# Patient Record
Sex: FEMALE | Race: WHITE | NOT HISPANIC OR LATINO | URBAN - METROPOLITAN AREA
[De-identification: names, ages, dates, MRNs, and addresses within clinical notes are randomized per-mention and may not be internally consistent; named-entity substitution may affect disease eponyms.]

---

## 2017-04-29 ENCOUNTER — INPATIENT (INPATIENT)
Facility: HOSPITAL | Age: 52
LOS: 3 days | Discharge: NURSING, REHAB OR LONG-TERM CARE | DRG: 535 | End: 2017-05-03
Attending: HOSPITALIST | Admitting: HOSPITALIST
Payer: COMMERCIAL

## 2017-04-29 VITALS
HEART RATE: 62 BPM | RESPIRATION RATE: 18 BRPM | TEMPERATURE: 98 F | OXYGEN SATURATION: 100 % | DIASTOLIC BLOOD PRESSURE: 91 MMHG | HEIGHT: 65 IN | SYSTOLIC BLOOD PRESSURE: 145 MMHG | WEIGHT: 115.08 LBS

## 2017-04-29 DIAGNOSIS — Z29.9 ENCOUNTER FOR PROPHYLACTIC MEASURES, UNSPECIFIED: ICD-10-CM

## 2017-04-29 DIAGNOSIS — Z90.49 ACQUIRED ABSENCE OF OTHER SPECIFIED PARTS OF DIGESTIVE TRACT: Chronic | ICD-10-CM

## 2017-04-29 DIAGNOSIS — R74.0 NONSPECIFIC ELEVATION OF LEVELS OF TRANSAMINASE AND LACTIC ACID DEHYDROGENASE [LDH]: ICD-10-CM

## 2017-04-29 DIAGNOSIS — S32.9XXA FRACTURE OF UNSPECIFIED PARTS OF LUMBOSACRAL SPINE AND PELVIS, INITIAL ENCOUNTER FOR CLOSED FRACTURE: ICD-10-CM

## 2017-04-29 DIAGNOSIS — Z90.89 ACQUIRED ABSENCE OF OTHER ORGANS: Chronic | ICD-10-CM

## 2017-04-29 DIAGNOSIS — S70.02XA CONTUSION OF LEFT HIP, INITIAL ENCOUNTER: ICD-10-CM

## 2017-04-29 DIAGNOSIS — R63.8 OTHER SYMPTOMS AND SIGNS CONCERNING FOOD AND FLUID INTAKE: ICD-10-CM
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
## 2017-04-29 DIAGNOSIS — E87.1 HYPO-OSMOLALITY AND HYPONATREMIA: ICD-10-CM

## 2017-04-29 LAB
ALBUMIN SERPL ELPH-MCNC: 3.6 G/DL — SIGNIFICANT CHANGE UP (ref 3.4–5)
ALP SERPL-CCNC: 58 U/L — SIGNIFICANT CHANGE UP (ref 40–120)
ALT FLD-CCNC: 45 U/L — HIGH (ref 12–42)
ANION GAP SERPL CALC-SCNC: 6 MMOL/L — LOW (ref 9–16)
APPEARANCE UR: CLEAR — SIGNIFICANT CHANGE UP
APTT BLD: 28.6 SEC — SIGNIFICANT CHANGE UP (ref 27.5–37.4)
AST SERPL-CCNC: 47 U/L — HIGH (ref 15–37)
BASOPHILS NFR BLD AUTO: 0.3 % — SIGNIFICANT CHANGE UP (ref 0–2)
BILIRUB SERPL-MCNC: 1.2 MG/DL — SIGNIFICANT CHANGE UP (ref 0.2–1.2)
BILIRUB UR-MCNC: NEGATIVE — SIGNIFICANT CHANGE UP
BLD GP AB SCN SERPL QL: NEGATIVE — SIGNIFICANT CHANGE UP
BUN SERPL-MCNC: 16 MG/DL — SIGNIFICANT CHANGE UP (ref 7–23)
CALCIUM SERPL-MCNC: 8.6 MG/DL — SIGNIFICANT CHANGE UP (ref 8.5–10.5)
CHLORIDE SERPL-SCNC: 99 MMOL/L — SIGNIFICANT CHANGE UP (ref 96–108)
CK SERPL-CCNC: 371 U/L — HIGH (ref 26–192)
CO2 SERPL-SCNC: 27 MMOL/L — SIGNIFICANT CHANGE UP (ref 22–31)
COLOR SPEC: YELLOW — SIGNIFICANT CHANGE UP
CREAT SERPL-MCNC: 0.6 MG/DL — SIGNIFICANT CHANGE UP (ref 0.5–1.3)
DIFF PNL FLD: NEGATIVE — SIGNIFICANT CHANGE UP
EOSINOPHIL NFR BLD AUTO: 0.3 % — SIGNIFICANT CHANGE UP (ref 0–6)
GLUCOSE SERPL-MCNC: 91 MG/DL — SIGNIFICANT CHANGE UP (ref 70–99)
GLUCOSE UR QL: NEGATIVE — SIGNIFICANT CHANGE UP
HCT VFR BLD CALC: 34 % — LOW (ref 34.5–45)
HCT VFR BLD CALC: 36.8 % — SIGNIFICANT CHANGE UP (ref 34.5–45)
HGB BLD-MCNC: 11.6 G/DL — SIGNIFICANT CHANGE UP (ref 11.5–15.5)
HGB BLD-MCNC: 12.7 G/DL — SIGNIFICANT CHANGE UP (ref 11.5–15.5)
INR BLD: 0.96 — SIGNIFICANT CHANGE UP (ref 0.88–1.16)
KETONES UR-MCNC: NEGATIVE — SIGNIFICANT CHANGE UP
LEUKOCYTE ESTERASE UR-ACNC: NEGATIVE — SIGNIFICANT CHANGE UP
LYMPHOCYTES # BLD AUTO: 19.8 % — SIGNIFICANT CHANGE UP (ref 13–44)
MCHC RBC-ENTMCNC: 31.1 PG — SIGNIFICANT CHANGE UP (ref 27–34)
MCHC RBC-ENTMCNC: 31.8 PG — SIGNIFICANT CHANGE UP (ref 27–34)
MCHC RBC-ENTMCNC: 34.1 G/DL — SIGNIFICANT CHANGE UP (ref 32–36)
MCHC RBC-ENTMCNC: 34.5 G/DL — SIGNIFICANT CHANGE UP (ref 32–36)
MCV RBC AUTO: 91.2 FL — SIGNIFICANT CHANGE UP (ref 80–100)
MCV RBC AUTO: 92 FL — SIGNIFICANT CHANGE UP (ref 80–100)
MONOCYTES NFR BLD AUTO: 7.2 % — SIGNIFICANT CHANGE UP (ref 2–14)
NEUTROPHILS NFR BLD AUTO: 72.4 % — SIGNIFICANT CHANGE UP (ref 43–77)
NITRITE UR-MCNC: NEGATIVE — SIGNIFICANT CHANGE UP
PH UR: 7 — SIGNIFICANT CHANGE UP (ref 5–8)
PLATELET # BLD AUTO: 170 K/UL — SIGNIFICANT CHANGE UP (ref 150–400)
PLATELET # BLD AUTO: 188 K/UL — SIGNIFICANT CHANGE UP (ref 150–400)
POTASSIUM SERPL-MCNC: 4.8 MMOL/L — SIGNIFICANT CHANGE UP (ref 3.5–5.3)
POTASSIUM SERPL-SCNC: 4.8 MMOL/L — SIGNIFICANT CHANGE UP (ref 3.5–5.3)
PROT SERPL-MCNC: 6.8 G/DL — SIGNIFICANT CHANGE UP (ref 6.4–8.2)
PROT UR-MCNC: NEGATIVE MG/DL — SIGNIFICANT CHANGE UP
PROTHROM AB SERPL-ACNC: 10.7 SEC — SIGNIFICANT CHANGE UP (ref 9.8–12.7)
RBC # BLD: 3.73 M/UL — LOW (ref 3.8–5.2)
RBC # BLD: 4 M/UL — SIGNIFICANT CHANGE UP (ref 3.8–5.2)
RBC # FLD: 13 % — SIGNIFICANT CHANGE UP (ref 10.3–16.9)
RBC # FLD: 13.5 % — SIGNIFICANT CHANGE UP (ref 10.3–16.9)
RH IG SCN BLD-IMP: POSITIVE — SIGNIFICANT CHANGE UP
SODIUM SERPL-SCNC: 132 MMOL/L — LOW (ref 135–145)
SP GR SPEC: <=1.005 — SIGNIFICANT CHANGE UP (ref 1–1.03)
UROBILINOGEN FLD QL: 0.2 E.U./DL — SIGNIFICANT CHANGE UP
WBC # BLD: 5.4 K/UL — SIGNIFICANT CHANGE UP (ref 3.8–10.5)
WBC # BLD: 6.2 K/UL — SIGNIFICANT CHANGE UP (ref 3.8–10.5)
WBC # FLD AUTO: 5.4 K/UL — SIGNIFICANT CHANGE UP (ref 3.8–10.5)
WBC # FLD AUTO: 6.2 K/UL — SIGNIFICANT CHANGE UP (ref 3.8–10.5)

## 2017-04-29 PROCEDURE — 73700 CT LOWER EXTREMITY W/O DYE: CPT | Mod: 26,LT

## 2017-04-29 PROCEDURE — 72192 CT PELVIS W/O DYE: CPT | Mod: 26

## 2017-04-29 PROCEDURE — 73502 X-RAY EXAM HIP UNI 2-3 VIEWS: CPT | Mod: 26,LT

## 2017-04-29 PROCEDURE — 71101 X-RAY EXAM UNILAT RIBS/CHEST: CPT | Mod: 26,LT

## 2017-04-29 PROCEDURE — 99223 1ST HOSP IP/OBS HIGH 75: CPT

## 2017-04-29 PROCEDURE — 72190 X-RAY EXAM OF PELVIS: CPT | Mod: 26

## 2017-04-29 PROCEDURE — 99285 EMERGENCY DEPT VISIT HI MDM: CPT

## 2017-04-29 PROCEDURE — 72110 X-RAY EXAM L-2 SPINE 4/>VWS: CPT | Mod: 26

## 2017-04-29 RX ORDER — SODIUM CHLORIDE 9 MG/ML
1000 INJECTION INTRAMUSCULAR; INTRAVENOUS; SUBCUTANEOUS
Qty: 0 | Refills: 0 | Status: DISCONTINUED | OUTPATIENT
Start: 2017-04-29 | End: 2017-04-29

## 2017-04-29 RX ORDER — HEPARIN SODIUM 5000 [USP'U]/ML
5000 INJECTION INTRAVENOUS; SUBCUTANEOUS EVERY 8 HOURS
Qty: 0 | Refills: 0 | Status: DISCONTINUED | OUTPATIENT
Start: 2017-04-29 | End: 2017-04-29

## 2017-04-29 RX ORDER — ACETAMINOPHEN 500 MG
650 TABLET ORAL EVERY 6 HOURS
Qty: 0 | Refills: 0 | Status: DISCONTINUED | OUTPATIENT
Start: 2017-04-29 | End: 2017-05-03

## 2017-04-29 RX ORDER — IBUPROFEN 200 MG
600 TABLET ORAL EVERY 6 HOURS
Qty: 0 | Refills: 0 | Status: DISCONTINUED | OUTPATIENT
Start: 2017-04-29 | End: 2017-05-03

## 2017-04-29 RX ORDER — SODIUM CHLORIDE 9 MG/ML
1000 INJECTION INTRAMUSCULAR; INTRAVENOUS; SUBCUTANEOUS
Qty: 0 | Refills: 0 | Status: DISCONTINUED | OUTPATIENT
Start: 2017-04-29 | End: 2017-05-03

## 2017-04-29 RX ORDER — TRAMADOL HYDROCHLORIDE 50 MG/1
50 TABLET ORAL EVERY 6 HOURS
Qty: 0 | Refills: 0 | Status: DISCONTINUED | OUTPATIENT
Start: 2017-04-29 | End: 2017-05-03

## 2017-04-29 RX ADMIN — TRAMADOL HYDROCHLORIDE 50 MILLIGRAM(S): 50 TABLET ORAL at 22:51

## 2017-04-29 RX ADMIN — SODIUM CHLORIDE 100 MILLILITER(S): 9 INJECTION INTRAMUSCULAR; INTRAVENOUS; SUBCUTANEOUS at 22:51

## 2017-04-29 RX ADMIN — TRAMADOL HYDROCHLORIDE 50 MILLIGRAM(S): 50 TABLET ORAL at 23:40

## 2017-04-29 NOTE — ED PROVIDER NOTE - PROGRESS NOTE DETAILS
ortho consulted for pevlic fx - non op - pt unable to ambulate and lives by herself - will admit for further mgmt including PT eval and possible rehab
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
## 2017-04-29 NOTE — H&P ADULT - NSHPPHYSICALEXAM_GEN_ALL_CORE
GEN: NAD.   HEENT: MMM. MELVINA. EOMI. Minor abrasion seen on L scalp, non tender.   Neck: Supple.  CV: RRR, no g/r/m.  Pulm: CTA b/l.  Back: No spinal tenderness.  Abd: Normoactive BS. NTND.   Ext: No edema. PT and DP pulses equal b/l.  Neuro: SEDA. EOMI. Tongue and uvula midline. Facial sensation in tact. No facial asymmetry. 5/5 strength b/l UEs. 4/5 strength LEs due to pain. Sensation to light touch in tact all 4 extremities, equal. Patellar reflexes normal b/l. Babinski neg b/l.   Musculoskeletal: Slight point tenderness at L midaxillary line, 5th rib. Large 5cm x3cm hematoma (indurated area outlined) at L hip. Small 3cm ecchymosis L flank. No sig pelvic tenderness. GEN: NAD.   HEENT: MMM. MELVINA. EOMI. Minor abrasion seen on L scalp, non tender.   Neck: Supple.  CV: RRR, no g/r/m.  Pulm: CTA b/l.  Back: No spinal tenderness.  Abd: Normoactive BS. NTND.   Ext: No edema. PT and DP pulses equal b/l.  Neuro: SEDA. EOMI. Tongue and uvula midline. Facial sensation in tact. No facial asymmetry. 5/5 strength b/l UEs. 4/5 strength LEs due to pain. Sensation to light touch in tact all 4 extremities, equal. Patellar reflexes normal b/l. Babinski neg b/l.   Musculoskeletal: Slight point tenderness at L midaxillary line, 5th rib. Large 6cm x3cm hematoma (indurated area outlined) at L hip. Small 3cm ecchymosis L flank. No sig pelvic tenderness.
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
## 2017-04-29 NOTE — H&P ADULT - NSHPLABSRESULTS_GEN_ALL_CORE
04-29    132<L>  |  99  |  16  ----------------------------<  91  4.8   |  27  |  0.60    Ca    8.6      29 Apr 2017 20:17    TPro  6.8  /  Alb  3.6  /  TBili  1.2  /  DBili  x   /  AST  47<H>  /  ALT  45<H>  /  AlkPhos  58  04-29                          12.7   6.2   )-----------( 188      ( 29 Apr 2017 20:17 )             36.8
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
## 2017-04-29 NOTE — ED ADULT NURSE REASSESSMENT NOTE - NS ED NURSE REASSESS COMMENT FT1
Pt is alert and responsive. X-ray and Ct scan performed. Lab work drawn and sent to lab. No s/s of distress. Awaiting disposition
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
## 2017-04-29 NOTE — ED ADULT TRIAGE NOTE - ACCOMPANIED BY
EXAM DATE/TIME:  02/20/2017 04:47 

 

[facility] COMPARISON:     

No previous studies available for comparison.

 

                     

INDICATIONS :     

Abdomen pain.

                     

 

MEDICAL HISTORY :     

Hypertension.  Hypercholesterolemia.     History of anus carcinoma   

 

SURGICAL HISTORY :     

Hysterectomy.   

 

ENCOUNTER:     

Initial                                        

 

ACUITY:     

3 days      

 

PAIN SCORE:     

7/10

 

LOCATION:     

Bilateral  Abdomen

 

FINDINGS:     

Supine and upright views of the abdomen were performed.  The abdominal bowel gas pattern is normal.  
No air fluid levels are seen.  No abnormal masses, calcifications, or organomegaly is seen.  Clips ar
e seen in the right upper quadrant presumably from prior cholecystectomy. The visualized lower lungs 
are clear.  No evidence of free intraperitoneal gas. There is a dextrocurvature of the thoracic spine
. There some associated degenerative change. There is decreased density and remodeling of the superio
r aspect of the left femoral head potentially from prior avascular necrosis.

 

CONCLUSION:     

Negative abdominal series. An acute abnormality is not seen. There is chronic change described above.


 

 

 

 Taye Coyle MD on February 20, 2017 at 5:24           

Board Certified Radiologist.

 This report was verified electronically. Self
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
## 2017-04-29 NOTE — ED PROVIDER NOTE - MUSCULOSKELETAL, MLM
RUE - wnl, LUE - abrasions over posterior elbow, FROM at elbow, LLE - pain with lifting leg, pain to groin and left hip, no internal or external rotation, no distal nv deficit, RLE wnl

## 2017-04-29 NOTE — H&P ADULT - PROBLEM SELECTOR PLAN 1
Pt w/ R pubic ramus fx and L acetabulum fx. Ortho consulted in ED, per Ortho, non-operative fx, recommend wt bearing as tolerated, pain control, PT consult. No neurologic deficits.   - F/u official CT read.   - Pt states aversion to most opiates, will try Tylenol, Ibuprofen as needed w/ Tramadol PRN for severe pain.  - PT consult.
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
## 2017-04-29 NOTE — H&P ADULT - FAMILY HISTORY
Father  Still living? Unknown  Family history of CHF (congestive heart failure), Age at diagnosis: Age Unknown
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
## 2017-04-29 NOTE — H&P ADULT - NSHPSOCIALHISTORY_GEN_ALL_CORE
Denies tobacco use. States 2-3 glasses red wine/day, denies hx withdrawal in past. Former marijuana use, denies other drug use.

## 2017-04-30 DIAGNOSIS — G44.209 TENSION-TYPE HEADACHE, UNSPECIFIED, NOT INTRACTABLE: ICD-10-CM

## 2017-04-30 DIAGNOSIS — D70.8 OTHER NEUTROPENIA: ICD-10-CM

## 2017-04-30 DIAGNOSIS — R11.14 BILIOUS VOMITING: ICD-10-CM

## 2017-04-30 LAB
ALBUMIN SERPL ELPH-MCNC: 3.1 G/DL — LOW (ref 3.4–5)
ALP SERPL-CCNC: 46 U/L — SIGNIFICANT CHANGE UP (ref 40–120)
ALT FLD-CCNC: 36 U/L — SIGNIFICANT CHANGE UP (ref 12–42)
ANION GAP SERPL CALC-SCNC: 8 MMOL/L — LOW (ref 9–16)
AST SERPL-CCNC: 27 U/L — SIGNIFICANT CHANGE UP (ref 15–37)
BILIRUB SERPL-MCNC: 1.3 MG/DL — HIGH (ref 0.2–1.2)
BUN SERPL-MCNC: 9 MG/DL — SIGNIFICANT CHANGE UP (ref 7–23)
CALCIUM SERPL-MCNC: 8 MG/DL — LOW (ref 8.5–10.5)
CHLORIDE SERPL-SCNC: 101 MMOL/L — SIGNIFICANT CHANGE UP (ref 96–108)
CO2 SERPL-SCNC: 23 MMOL/L — SIGNIFICANT CHANGE UP (ref 22–31)
CREAT ?TM UR-MCNC: 36.8 MG/DL — SIGNIFICANT CHANGE UP
CREAT SERPL-MCNC: 0.57 MG/DL — SIGNIFICANT CHANGE UP (ref 0.5–1.3)
GLUCOSE SERPL-MCNC: 107 MG/DL — HIGH (ref 70–99)
HAV IGM SER-ACNC: SIGNIFICANT CHANGE UP
HBV CORE IGM SER-ACNC: SIGNIFICANT CHANGE UP
HBV SURFACE AB SER-ACNC: SIGNIFICANT CHANGE UP
HBV SURFACE AG SER-ACNC: SIGNIFICANT CHANGE UP
HCT VFR BLD CALC: 31 % — LOW (ref 34.5–45)
HCV AB S/CO SERPL IA: 0.07 S/CO — SIGNIFICANT CHANGE UP
HCV AB SERPL-IMP: SIGNIFICANT CHANGE UP
HGB BLD-MCNC: 10.5 G/DL — LOW (ref 11.5–15.5)
MAGNESIUM SERPL-MCNC: 1.9 MG/DL — SIGNIFICANT CHANGE UP (ref 1.6–2.4)
MCHC RBC-ENTMCNC: 31.1 PG — SIGNIFICANT CHANGE UP (ref 27–34)
MCHC RBC-ENTMCNC: 33.9 G/DL — SIGNIFICANT CHANGE UP (ref 32–36)
MCV RBC AUTO: 91.7 FL — SIGNIFICANT CHANGE UP (ref 80–100)
OSMOLALITY SERPL: 267 MOSM/KG — LOW (ref 280–301)
OSMOLALITY UR: 329 MOSMOL/KG — SIGNIFICANT CHANGE UP (ref 100–650)
PLATELET # BLD AUTO: 162 K/UL — SIGNIFICANT CHANGE UP (ref 150–400)
POTASSIUM SERPL-MCNC: 4.3 MMOL/L — SIGNIFICANT CHANGE UP (ref 3.5–5.3)
POTASSIUM SERPL-SCNC: 4.3 MMOL/L — SIGNIFICANT CHANGE UP (ref 3.5–5.3)
PROT SERPL-MCNC: 5.7 G/DL — LOW (ref 6.4–8.2)
RBC # BLD: 3.38 M/UL — LOW (ref 3.8–5.2)
RBC # FLD: 13.5 % — SIGNIFICANT CHANGE UP (ref 10.3–16.9)
SODIUM SERPL-SCNC: 132 MMOL/L — LOW (ref 135–145)
SODIUM UR-SCNC: 75 MMOL/L — SIGNIFICANT CHANGE UP
WBC # BLD: 3.1 K/UL — LOW (ref 3.8–10.5)
WBC # FLD AUTO: 3.1 K/UL — LOW (ref 3.8–10.5)

## 2017-04-30 PROCEDURE — 70450 CT HEAD/BRAIN W/O DYE: CPT | Mod: 26

## 2017-04-30 PROCEDURE — 99233 SBSQ HOSP IP/OBS HIGH 50: CPT | Mod: GC

## 2017-04-30 RX ORDER — FLUTICASONE PROPIONATE 50 MCG
1 SPRAY, SUSPENSION NASAL
Qty: 0 | Refills: 0 | Status: DISCONTINUED | OUTPATIENT
Start: 2017-04-30 | End: 2017-05-03

## 2017-04-30 RX ORDER — ONDANSETRON 8 MG/1
4 TABLET, FILM COATED ORAL EVERY 6 HOURS
Qty: 0 | Refills: 0 | Status: DISCONTINUED | OUTPATIENT
Start: 2017-04-30 | End: 2017-05-03

## 2017-04-30 RX ORDER — BACITRACIN ZINC 500 UNIT/G
1 OINTMENT IN PACKET (EA) TOPICAL DAILY
Qty: 0 | Refills: 0 | Status: DISCONTINUED | OUTPATIENT
Start: 2017-04-30 | End: 2017-05-03

## 2017-04-30 RX ORDER — PSEUDOEPHEDRINE HCL 30 MG
60 TABLET ORAL EVERY 12 HOURS
Qty: 0 | Refills: 0 | Status: DISCONTINUED | OUTPATIENT
Start: 2017-04-30 | End: 2017-05-03

## 2017-04-30 RX ADMIN — Medication 60 MILLIGRAM(S): at 18:59

## 2017-04-30 RX ADMIN — TRAMADOL HYDROCHLORIDE 50 MILLIGRAM(S): 50 TABLET ORAL at 22:46

## 2017-04-30 RX ADMIN — Medication 650 MILLIGRAM(S): at 06:45

## 2017-04-30 RX ADMIN — Medication 650 MILLIGRAM(S): at 06:03

## 2017-04-30 RX ADMIN — ONDANSETRON 4 MILLIGRAM(S): 8 TABLET, FILM COATED ORAL at 11:34

## 2017-04-30 RX ADMIN — Medication 1 SPRAY(S): at 18:59

## 2017-04-30 RX ADMIN — TRAMADOL HYDROCHLORIDE 50 MILLIGRAM(S): 50 TABLET ORAL at 23:15
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
## 2017-04-30 NOTE — PROGRESS NOTE ADULT - PROBLEM SELECTOR PLAN 4
- CT head negative   - To try sudafed and flonase for sinus headache and tylenol  - Con't to monitor if no improvement

## 2017-05-01 PROCEDURE — 99232 SBSQ HOSP IP/OBS MODERATE 35: CPT

## 2017-05-01 RX ADMIN — Medication 60 MILLIGRAM(S): at 18:23

## 2017-05-01 RX ADMIN — Medication 1 APPLICATION(S): at 12:35

## 2017-05-01 RX ADMIN — TRAMADOL HYDROCHLORIDE 50 MILLIGRAM(S): 50 TABLET ORAL at 12:35

## 2017-05-01 RX ADMIN — Medication 1 SPRAY(S): at 06:43

## 2017-05-01 RX ADMIN — Medication 1 SPRAY(S): at 18:23

## 2017-05-01 RX ADMIN — Medication 60 MILLIGRAM(S): at 06:43

## 2017-05-01 RX ADMIN — TRAMADOL HYDROCHLORIDE 50 MILLIGRAM(S): 50 TABLET ORAL at 12:41
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
## 2017-05-01 NOTE — PHYSICAL THERAPY INITIAL EVALUATION ADULT - PREDICTED DURATION OF THERAPY (DAYS/WKS), PT EVAL
Pt. will benefit from further PT follow up to improve functional mobility, gait, transfers, balance.
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
## 2017-05-01 NOTE — DISCHARGE NOTE ADULT - PATIENT PORTAL LINK FT
“You can access the Follow[facility] Patient Portal, offered by [facility], by registering with the following website: [email or web address]”
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
## 2017-05-01 NOTE — PROGRESS NOTE ADULT - PROBLEM SELECTOR PLAN 2
On exam roughly 6cm x 3cm L hip area, slightly tender on exam. On CT however, area [id number]. Likely 2/2 trauma. Area outlined. Will recheck CBC as pt states expanded in ED 2 hours ago but has remained roughly stable since then. Active type and screen. Will check area again overnight to ensure stability. Improving, roughly 5cm x 3cm L hip area, slightly tender on exam. On CT: area [id number]. Likely 2/2 trauma.  Hgb stable.  - No intervention necessary

## 2017-05-01 NOTE — PROGRESS NOTE ADULT - SUBJECTIVE AND OBJECTIVE BOX
O/N Events: LIVIER  Subjective/ROS: Denies HA, CP, SOB, n/v, changes in bowel/urinary habits    VITALS  Vital Signs Last 24 Hrs  T(C): 36.8, Max: 37.3 (04-30 @ 15:35)  T(F): 98.2, Max: 99.2 (04-30 @ 15:35)  HR: 54 (54 - 73)  BP: 10/64 (10/64 - 114/67)  BP(mean): --  RR: 18 (16 - 18)  SpO2: 97% (96% - 100%)    CAPILLARY BLOOD GLUCOSE      PHYSICAL EXAM  General: A&Ox3; NAD  Head: NC/AT; MMM; PERRL; EOMI;  Neck: Supple; no JVD  Respiratory: CTA B/L; no wheezes/crackles   Cardiovascular: Regular rhythm/rate; S1/S2   Gastrointestinal: Soft; NTND; normoactive BS  Extremities: WWP; no edema/cyanosis  Neurological:  CNII-XII grossly intact; no obvious focal deficits    MEDICATIONS  (STANDING):  sodium chloride 0.9%. 1000milliLiter(s) IV Continuous <Continuous>  BACItracin   Ointment 1Application(s) Topical daily  pseudoephedrine 60milliGRAM(s) Oral every 12 hours  fluticasone propionate 50 MICROgram(s)/spray Nasal Spray 1Spray(s) Both Nostrils two times a day    MEDICATIONS  (PRN):  acetaminophen   Tablet. 650milliGRAM(s) Oral every 6 hours PRN Mild Pain (1 - 3)  ibuprofen  Tablet 600milliGRAM(s) Oral every 6 hours PRN Moderate pain  traMADol 50milliGRAM(s) Oral every 6 hours PRN Severe Pain (7 - 10)  ondansetron Injectable 4milliGRAM(s) IV Push every 6 hours PRN Nausea and/or Vomiting      Hayfever (Rash)  No Known Drug Allergies      LABS                        10.5   3.1   )-----------( 162      ( 30 Apr 2017 08:31 )             31.0     04-30    132<L>  |  101  |  9   ----------------------------<  107<H>  4.3   |  23  |  0.57    Ca    8.0<L>      30 Apr 2017 08:31  Mg     1.9     04-30    TPro  5.7<L>  /  Alb  3.1<L>  /  TBili  1.3<H>  /  DBili  x   /  AST  27  /  ALT  36  /  AlkPhos  46  04-30    PT/INR - ( 29 Apr 2017 20:17 )   PT: 10.7 sec;   INR: 0.96          PTT - ( 29 Apr 2017 20:17 )  PTT:28.6 sec  Urinalysis Basic - ( 29 Apr 2017 20:17 )    Color: Yellow / Appearance: Clear / SG: <=1.005 / pH: x  Gluc: x / Ketone: NEGATIVE  / Bili: NEGATIVE / Urobili: 0.2 E.U./dL   Blood: x / Protein: NEGATIVE mg/dL / Nitrite: NEGATIVE   Leuk Esterase: NEGATIVE / RBC: x / WBC x   Sq Epi: x / Non Sq Epi: x / Bacteria: x      CARDIAC MARKERS ( 29 Apr 2017 20:17 )  x     / x     / 371 U/L / x     / x            IMAGING/EKG/ETC  CT Head: The ventricles, sulci and cisterns are normal in caliber for patient's   age.  There is no evidence of intracranial hemorrhage, mass or acute   infarction.  There is no lesion or fracture of theskull or skull base.    CT Hip: Impression:   Nondisplaced fracture of the wing of the left ilium extending to the   acetabulum.   Minimally displaced fracture of the right pubic tubercle extending into   the pubic symphysis.   Cortical irregularity of the left ischium may represent acute fracture   versus old fracture.   Left hip soft tissue hematoma measuring 11 cm in greatest dimension.    CT Pelvis: Impression:   Nondisplaced fracture of the wing of the left ilium extending to the   acetabulum.   Minimally displaced fracture of the right pubic tubercle extending into   the pubic symphysis.   Cortical irregularity of the left ischium may represent acute fracture   versus old fracture.   Left hip soft tissue hematoma measuring 11 cm in greatest dimension. O/N Events: LIVIER  Subjective/ROS: Endorses hip pain, back pain, chest pain (at rib fxr site).  Vomited last night once after CT w/ mild nausea.  Denies HA, SOB, changes in bowel/urinary habits    VITALS  Vital Signs Last 24 Hrs  T(C): 36.8, Max: 37.3 (04-30 @ 15:35)  T(F): 98.2, Max: 99.2 (04-30 @ 15:35)  HR: 54 (54 - 73)  BP: 10/64 (10/64 - 114/67)  BP(mean): --  RR: 18 (16 - 18)  SpO2: 97% (96% - 100%)    CAPILLARY BLOOD GLUCOSE    PHYSICAL EXAM  General: A&Ox3; distress 2/2 pain  Head: NC/AT; MMM; PERRL; EOMI;  Respiratory: CTA B/L; no wheezes/crackles   Cardiovascular: Regular rhythm/rate; S1/S2   Gastrointestinal: Soft; NTND; normoactive BS  Extremities: WWP; no edema/cyanosis  Neurological:  CNII-XII grossly intact; no obvious focal deficits  MSK: Hematoma w/ ecchymosis on L. hip.  Abrasion of L elbow.  Chest tender to palpation on LL.      MEDICATIONS  (STANDING):  sodium chloride 0.9%. 1000milliLiter(s) IV Continuous <Continuous>  BACItracin   Ointment 1Application(s) Topical daily  pseudoephedrine 60milliGRAM(s) Oral every 12 hours  fluticasone propionate 50 MICROgram(s)/spray Nasal Spray 1Spray(s) Both Nostrils two times a day    MEDICATIONS  (PRN):  acetaminophen   Tablet. 650milliGRAM(s) Oral every 6 hours PRN Mild Pain (1 - 3)  ibuprofen  Tablet 600milliGRAM(s) Oral every 6 hours PRN Moderate pain  traMADol 50milliGRAM(s) Oral every 6 hours PRN Severe Pain (7 - 10)  ondansetron Injectable 4milliGRAM(s) IV Push every 6 hours PRN Nausea and/or Vomiting    Hayfever (Rash)  No Known Drug Allergies    LABS                        10.5   3.1   )-----------( 162      ( 30 Apr 2017 08:31 )             31.0     04-30    132<L>  |  101  |  9   ----------------------------<  107<H>  4.3   |  23  |  0.57    Ca    8.0<L>      30 Apr 2017 08:31  Mg     1.9     04-30    TPro  5.7<L>  /  Alb  3.1<L>  /  TBili  1.3<H>  /  DBili  x   /  AST  27  /  ALT  36  /  AlkPhos  46  04-30    PT/INR - ( 29 Apr 2017 20:17 )   PT: 10.7 sec;   INR: 0.96          PTT - ( 29 Apr 2017 20:17 )  PTT:28.6 sec  Urinalysis Basic - ( 29 Apr 2017 20:17 )    Color: Yellow / Appearance: Clear / SG: <=1.005 / pH: x  Gluc: x / Ketone: NEGATIVE  / Bili: NEGATIVE / Urobili: 0.2 E.U./dL   Blood: x / Protein: NEGATIVE mg/dL / Nitrite: NEGATIVE   Leuk Esterase: NEGATIVE / RBC: x / WBC x   Sq Epi: x / Non Sq Epi: x / Bacteria: x    CARDIAC MARKERS ( 29 Apr 2017 20:17 )  x     / x     / 371 U/L / x     / x        IMAGING/EKG/ETC  CT Head: The ventricles, sulci and cisterns are normal in caliber for patient's   age.  There is no evidence of intracranial hemorrhage, mass or acute   infarction.  There is no lesion or fracture of theskull or skull base.    CT Hip: Impression:   Nondisplaced fracture of the wing of the left ilium extending to the   acetabulum.   Minimally displaced fracture of the right pubic tubercle extending into   the pubic symphysis.   Cortical irregularity of the left ischium may represent acute fracture   versus old fracture.   Left hip soft tissue hematoma measuring 11 cm in greatest dimension.    CT Pelvis: Impression:   Nondisplaced fracture of the wing of the left ilium extending to the   acetabulum.   Minimally displaced fracture of the right pubic tubercle extending into   the pubic symphysis.   Cortical irregularity of the left ischium may represent acute fracture   versus old fracture.   Left hip soft tissue hematoma measuring 11 cm in greatest dimension.
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
## 2017-05-01 NOTE — PROGRESS NOTE ADULT - PROBLEM SELECTOR PLAN 7
Regular diet. Replete as needed. S/p IVF; Replete electrolytes PRN; Regular diet  Code: Full  Dispo: D/c to rehab  Access: Peripheral  Saenz: No
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
## 2017-05-02 LAB
ANION GAP SERPL CALC-SCNC: 6 MMOL/L — LOW (ref 9–16)
BUN SERPL-MCNC: 13 MG/DL — SIGNIFICANT CHANGE UP (ref 7–23)
CALCIUM SERPL-MCNC: 8.8 MG/DL — SIGNIFICANT CHANGE UP (ref 8.5–10.5)
CHLORIDE SERPL-SCNC: 106 MMOL/L — SIGNIFICANT CHANGE UP (ref 96–108)
CO2 SERPL-SCNC: 27 MMOL/L — SIGNIFICANT CHANGE UP (ref 22–31)
CREAT SERPL-MCNC: 0.64 MG/DL — SIGNIFICANT CHANGE UP (ref 0.5–1.3)
GLUCOSE SERPL-MCNC: 109 MG/DL — HIGH (ref 70–99)
HCT VFR BLD CALC: 34.8 % — SIGNIFICANT CHANGE UP (ref 34.5–45)
HGB BLD-MCNC: 11.6 G/DL — SIGNIFICANT CHANGE UP (ref 11.5–15.5)
MCHC RBC-ENTMCNC: 31.7 PG — SIGNIFICANT CHANGE UP (ref 27–34)
MCHC RBC-ENTMCNC: 33.3 G/DL — SIGNIFICANT CHANGE UP (ref 32–36)
MCV RBC AUTO: 95.1 FL — SIGNIFICANT CHANGE UP (ref 80–100)
PLATELET # BLD AUTO: 188 K/UL — SIGNIFICANT CHANGE UP (ref 150–400)
POTASSIUM SERPL-MCNC: 4.9 MMOL/L — SIGNIFICANT CHANGE UP (ref 3.5–5.3)
POTASSIUM SERPL-SCNC: 4.9 MMOL/L — SIGNIFICANT CHANGE UP (ref 3.5–5.3)
RBC # BLD: 3.66 M/UL — LOW (ref 3.8–5.2)
RBC # FLD: 13.1 % — SIGNIFICANT CHANGE UP (ref 10.3–16.9)
SODIUM SERPL-SCNC: 139 MMOL/L — SIGNIFICANT CHANGE UP (ref 135–145)
WBC # BLD: 3.6 K/UL — LOW (ref 3.8–10.5)
WBC # FLD AUTO: 3.6 K/UL — LOW (ref 3.8–10.5)

## 2017-05-02 PROCEDURE — 99232 SBSQ HOSP IP/OBS MODERATE 35: CPT | Mod: GC

## 2017-05-02 RX ORDER — POLYETHYLENE GLYCOL 3350 17 G/17G
17 POWDER, FOR SOLUTION ORAL DAILY
Qty: 0 | Refills: 0 | Status: DISCONTINUED | OUTPATIENT
Start: 2017-05-02 | End: 2017-05-03

## 2017-05-02 RX ADMIN — Medication 60 MILLIGRAM(S): at 05:49

## 2017-05-02 RX ADMIN — POLYETHYLENE GLYCOL 3350 17 GRAM(S): 17 POWDER, FOR SOLUTION ORAL at 18:27

## 2017-05-02 RX ADMIN — Medication 1 SPRAY(S): at 05:49

## 2017-05-02 RX ADMIN — Medication 1 APPLICATION(S): at 11:24

## 2017-05-02 NOTE — PROGRESS NOTE ADULT - PROBLEM SELECTOR PLAN 2
Improving, roughly 5cm x 3cm L hip area, slightly tender on exam. On CT: area [id number]. Likely 2/2 trauma.  Hgb stable.  - No intervention necessary
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
## 2017-05-02 NOTE — PROGRESS NOTE ADULT - PROBLEM SELECTOR PLAN 7
S/p IVF; Replete electrolytes PRN; Regular diet  Code: Full  Dispo: D/c to rehab  Access: Peripheral  Saenz: No
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
## 2017-05-02 NOTE — PROGRESS NOTE ADULT - SUBJECTIVE AND OBJECTIVE BOX
O/N Events: LIVIER  Subjective/ROS: Endorses hip pain, back pain, chest pain (at rib fxr site).  Denies HA, SOB, changes in bowel/urinary habits    VITALS  Vital Signs Last 24 Hrs  T(C): 37.1, Max: 37.1 (05-02 @ 08:36)  T(F): 98.8, Max: 98.8 (05-02 @ 08:36)  HR: 58 (58 - 65)  BP: 111/71 (96/57 - 111/71)  BP(mean): --  RR: 15 (15 - 18)  SpO2: 97% (95% - 98%)    CAPILLARY BLOOD GLUCOSE    PHYSICAL EXAM  General: A&Ox3; distress 2/2 pain  Head: NC/AT; MMM; PERRL; EOMI;  Respiratory: CTA B/L; no wheezes/crackles   Cardiovascular: Regular rhythm/rate; S1/S2   Gastrointestinal: Soft; NTND; normoactive BS  Extremities: WWP; no edema/cyanosis  Neurological:  CNII-XII grossly intact; no obvious focal deficits  MSK: Hematoma w/ ecchymosis on L. hip.  Abrasion of L elbow.  Chest tender to palpation on LL.      MEDICATIONS  (STANDING):  sodium chloride 0.9%. 1000milliLiter(s) IV Continuous <Continuous>  BACItracin   Ointment 1Application(s) Topical daily  pseudoephedrine 60milliGRAM(s) Oral every 12 hours  fluticasone propionate 50 MICROgram(s)/spray Nasal Spray 1Spray(s) Both Nostrils two times a day    MEDICATIONS  (PRN):  acetaminophen   Tablet. 650milliGRAM(s) Oral every 6 hours PRN Mild Pain (1 - 3)  ibuprofen  Tablet 600milliGRAM(s) Oral every 6 hours PRN Moderate pain  traMADol 50milliGRAM(s) Oral every 6 hours PRN Severe Pain (7 - 10)  ondansetron Injectable 4milliGRAM(s) IV Push every 6 hours PRN Nausea and/or Vomiting      Hayfever (Rash)  No Known Drug Allergies      LABS                        11.6   3.6   )-----------( 188      ( 02 May 2017 06:18 )             34.8     05-02    139  |  106  |  13  ----------------------------<  109<H>  4.9   |  27  |  0.64    Ca    8.8      02 May 2017 06:29  Phos  2.9     05-01  Mg     2.0     05-01      IMAGING/EKG/ETC  CT Head: The ventricles, sulci and cisterns are normal in caliber for patient's   age.  There is no evidence of intracranial hemorrhage, mass or acute   infarction.  There is no lesion or fracture of theskull or skull base.    CT Hip: Impression:   Nondisplaced fracture of the wing of the left ilium extending to the   acetabulum.   Minimally displaced fracture of the right pubic tubercle extending into   the pubic symphysis.   Cortical irregularity of the left ischium may represent acute fracture   versus old fracture.   Left hip soft tissue hematoma measuring 11 cm in greatest dimension.    CT Pelvis: Impression:   Nondisplaced fracture of the wing of the left ilium extending to the   acetabulum.   Minimally displaced fracture of the right pubic tubercle extending into   the pubic symphysis.   Cortical irregularity of the left ischium may represent acute fracture   versus old fracture.   Left hip soft tissue hematoma measuring 11 cm in greatest dimension.
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
## 2017-05-03 VITALS
TEMPERATURE: 98 F | HEART RATE: 62 BPM | DIASTOLIC BLOOD PRESSURE: 63 MMHG | RESPIRATION RATE: 17 BRPM | OXYGEN SATURATION: 97 % | SYSTOLIC BLOOD PRESSURE: 100 MMHG

## 2017-05-03 DIAGNOSIS — M25.512 PAIN IN LEFT SHOULDER: ICD-10-CM

## 2017-05-03 PROCEDURE — 85610 PROTHROMBIN TIME: CPT

## 2017-05-03 PROCEDURE — 36415 COLL VENOUS BLD VENIPUNCTURE: CPT

## 2017-05-03 PROCEDURE — 85730 THROMBOPLASTIN TIME PARTIAL: CPT

## 2017-05-03 PROCEDURE — 86850 RBC ANTIBODY SCREEN: CPT

## 2017-05-03 PROCEDURE — 94640 AIRWAY INHALATION TREATMENT: CPT

## 2017-05-03 PROCEDURE — 86706 HEP B SURFACE ANTIBODY: CPT

## 2017-05-03 PROCEDURE — 86901 BLOOD TYPING SEROLOGIC RH(D): CPT

## 2017-05-03 PROCEDURE — 99285 EMERGENCY DEPT VISIT HI MDM: CPT | Mod: 25

## 2017-05-03 PROCEDURE — 73700 CT LOWER EXTREMITY W/O DYE: CPT

## 2017-05-03 PROCEDURE — 80074 ACUTE HEPATITIS PANEL: CPT

## 2017-05-03 PROCEDURE — 73502 X-RAY EXAM HIP UNI 2-3 VIEWS: CPT

## 2017-05-03 PROCEDURE — 84100 ASSAY OF PHOSPHORUS: CPT

## 2017-05-03 PROCEDURE — 99239 HOSP IP/OBS DSCHRG MGMT >30: CPT

## 2017-05-03 PROCEDURE — 72192 CT PELVIS W/O DYE: CPT

## 2017-05-03 PROCEDURE — 72190 X-RAY EXAM OF PELVIS: CPT

## 2017-05-03 PROCEDURE — 72110 X-RAY EXAM L-2 SPINE 4/>VWS: CPT

## 2017-05-03 PROCEDURE — 83930 ASSAY OF BLOOD OSMOLALITY: CPT

## 2017-05-03 PROCEDURE — 83735 ASSAY OF MAGNESIUM: CPT

## 2017-05-03 PROCEDURE — 80053 COMPREHEN METABOLIC PANEL: CPT

## 2017-05-03 PROCEDURE — 86900 BLOOD TYPING SEROLOGIC ABO: CPT

## 2017-05-03 PROCEDURE — 82550 ASSAY OF CK (CPK): CPT

## 2017-05-03 PROCEDURE — 97116 GAIT TRAINING THERAPY: CPT

## 2017-05-03 PROCEDURE — 81003 URINALYSIS AUTO W/O SCOPE: CPT

## 2017-05-03 PROCEDURE — 83935 ASSAY OF URINE OSMOLALITY: CPT

## 2017-05-03 PROCEDURE — 85027 COMPLETE CBC AUTOMATED: CPT

## 2017-05-03 PROCEDURE — 85025 COMPLETE CBC W/AUTO DIFF WBC: CPT

## 2017-05-03 PROCEDURE — 70450 CT HEAD/BRAIN W/O DYE: CPT

## 2017-05-03 PROCEDURE — 82570 ASSAY OF URINE CREATININE: CPT

## 2017-05-03 PROCEDURE — 84300 ASSAY OF URINE SODIUM: CPT

## 2017-05-03 PROCEDURE — 97161 PT EVAL LOW COMPLEX 20 MIN: CPT

## 2017-05-03 PROCEDURE — 80048 BASIC METABOLIC PNL TOTAL CA: CPT

## 2017-05-03 PROCEDURE — 71101 X-RAY EXAM UNILAT RIBS/CHEST: CPT

## 2017-05-03 RX ORDER — TRAMADOL HYDROCHLORIDE 50 MG/1
1 TABLET ORAL
Qty: 0 | Refills: 0 | COMMUNITY
Start: 2017-05-03

## 2017-05-03 RX ORDER — BACITRACIN ZINC 500 UNIT/G
1 OINTMENT IN PACKET (EA) TOPICAL
Qty: 0 | Refills: 0 | COMMUNITY
Start: 2017-05-03

## 2017-05-03 RX ORDER — POLYETHYLENE GLYCOL 3350 17 G/17G
17 POWDER, FOR SOLUTION ORAL
Qty: 0 | Refills: 0 | COMMUNITY
Start: 2017-05-03

## 2017-05-03 RX ORDER — ACETAMINOPHEN 500 MG
2 TABLET ORAL
Qty: 0 | Refills: 0 | COMMUNITY
Start: 2017-05-03

## 2017-05-03 RX ORDER — IBUPROFEN 200 MG
1 TABLET ORAL
Qty: 0 | Refills: 0 | COMMUNITY
Start: 2017-05-03

## 2017-05-03 RX ADMIN — Medication 60 MILLIGRAM(S): at 07:19

## 2017-05-03 RX ADMIN — Medication 1 SPRAY(S): at 07:19

## 2017-05-03 RX ADMIN — Medication 1 APPLICATION(S): at 11:26

## 2017-05-03 RX ADMIN — POLYETHYLENE GLYCOL 3350 17 GRAM(S): 17 POWDER, FOR SOLUTION ORAL at 11:26
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
## 2017-05-03 NOTE — PROGRESS NOTE ADULT - PROBLEM SELECTOR PLAN 8
S/p IVF; Replete electrolytes PRN; Regular diet  Code: Full  Dispo: D/c to rehab  Access: Peripheral  Saenz: No
- Zofran  - Clear liquid diet when ceases.
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
## 2017-05-03 NOTE — PROGRESS NOTE ADULT - PROBLEM SELECTOR PLAN 7
S/p IVF; Replete electrolytes PRN; Regular diet  Code: Full  Dispo: D/c to rehab  Access: Peripheral  Saenz: No Hold HSQ for L hip hematoma; SCDs.

## 2017-05-03 NOTE — PROGRESS NOTE ADULT - SUBJECTIVE AND OBJECTIVE BOX
O/N Events: LIVIER  Subjective/ROS: Endorses hip pain, back pain, chest pain (at rib fxr site).  Denies HA, SOB, changes in bowel/urinary habits      PHYSICAL EXAM  General: A&Ox3; distress 2/2 pain  Head: NC/AT; MMM; PERRL; EOMI;  Respiratory: CTA B/L; no wheezes/crackles   Cardiovascular: Regular rhythm/rate; S1/S2   Gastrointestinal: Soft; NTND; normoactive BS  Extremities: WWP; no edema/cyanosis  Neurological:  CNII-XII grossly intact; no obvious focal deficits  MSK: Hematoma w/ ecchymosis on L. hip.  Abrasion of L elbow.  Chest tender to palpation on LL.          IMAGING/EKG/ETC  CT Head: The ventricles, sulci and cisterns are normal in caliber for patient's   age.  There is no evidence of intracranial hemorrhage, mass or acute   infarction.  There is no lesion or fracture of theskull or skull base.    CT Hip: Impression:   Nondisplaced fracture of the wing of the left ilium extending to the   acetabulum.   Minimally displaced fracture of the right pubic tubercle extending into   the pubic symphysis.   Cortical irregularity of the left ischium may represent acute fracture   versus old fracture.   Left hip soft tissue hematoma measuring 11 cm in greatest dimension.    CT Pelvis: Impression:   Nondisplaced fracture of the wing of the left ilium extending to the   acetabulum.   Minimally displaced fracture of the right pubic tubercle extending into   the pubic symphysis.   Cortical irregularity of the left ischium may represent acute fracture   versus old fracture.   Left hip soft tissue hematoma measuring 11 cm in greatest dimension. O/N Events: LIVIER  Subjective/ROS: Endorses hip pain, back pain, chest pain (at rib fxr site).  Denies HA, SOB, changes in bowel/urinary habits, ROS otherwise negative.    VITALS  Vital Signs Last 24 Hrs  T(C): 36.8, Max: 36.9 (05-02 @ 15:36)  T(F): 98.3, Max: 98.5 (05-02 @ 15:36)  HR: 62 (56 - 64)  BP: 100/63 (94/62 - 107/71)  BP(mean): --  RR: 17 (17 - 19)  SpO2: 97% (95% - 97%)    CAPILLARY BLOOD GLUCOSE      PHYSICAL EXAM  General: A&Ox3; distress 2/2 pain  Head: NC/AT; MMM; PERRL; EOMI;  Respiratory: CTA B/L; no wheezes/crackles   Cardiovascular: Regular rhythm/rate; S1/S2   Gastrointestinal: Soft; NTND; normoactive BS  Extremities: WWP; no edema/cyanosis  Neurological:  CNII-XII grossly intact; no obvious focal deficits  MSK: Hematoma w/ ecchymosis on L. hip.  Abrasion of L elbow.  Chest tender to palpation on LL.      MEDICATIONS  (STANDING):  sodium chloride 0.9%. 1000milliLiter(s) IV Continuous <Continuous>  BACItracin   Ointment 1Application(s) Topical daily  pseudoephedrine 60milliGRAM(s) Oral every 12 hours  fluticasone propionate 50 MICROgram(s)/spray Nasal Spray 1Spray(s) Both Nostrils two times a day  polyethylene glycol 3350 17Gram(s) Oral daily    MEDICATIONS  (PRN):  acetaminophen   Tablet. 650milliGRAM(s) Oral every 6 hours PRN Mild Pain (1 - 3)  ibuprofen  Tablet 600milliGRAM(s) Oral every 6 hours PRN Moderate pain  traMADol 50milliGRAM(s) Oral every 6 hours PRN Severe Pain (7 - 10)  ondansetron Injectable 4milliGRAM(s) IV Push every 6 hours PRN Nausea and/or Vomiting      Hayfever (Rash)  No Known Drug Allergies      LABS                        11.6   3.6   )-----------( 188      ( 02 May 2017 06:18 )             34.8     05-02    139  |  106  |  13  ----------------------------<  109<H>  4.9   |  27  |  0.64    Ca    8.8      02 May 2017 06:29      IMAGING/EKG/ETC  CT Head: The ventricles, sulci and cisterns are normal in caliber for patient's   age.  There is no evidence of intracranial hemorrhage, mass or acute   infarction.  There is no lesion or fracture of theskull or skull base.    CT Hip: Impression:   Nondisplaced fracture of the wing of the left ilium extending to the   acetabulum.   Minimally displaced fracture of the right pubic tubercle extending into   the pubic symphysis.   Cortical irregularity of the left ischium may represent acute fracture   versus old fracture.   Left hip soft tissue hematoma measuring 11 cm in greatest dimension.    CT Pelvis: Impression:   Nondisplaced fracture of the wing of the left ilium extending to the   acetabulum.   Minimally displaced fracture of the right pubic tubercle extending into   the pubic symphysis.   Cortical irregularity of the left ischium may represent acute fracture   versus old fracture.   Left hip soft tissue hematoma measuring 11 cm in greatest dimension. O/N Events: LIVIER  Subjective/ROS: Endorses hip pain, back pain, chest pain (at rib fxr site).  Denies HA, SOB, changes in bowel/urinary habits, ROS otherwise negative.  has mild left shoulder pain , but no pain with ROM. no hand numbness      VITALS  Vital Signs Last 24 Hrs  T(C): 36.8, Max: 36.9 (05-02 @ 15:36)  T(F): 98.3, Max: 98.5 (05-02 @ 15:36)  HR: 62 (56 - 64)  BP: 100/63 (94/62 - 107/71)  BP(mean): --  RR: 17 (17 - 19)  SpO2: 97% (95% - 97%)    CAPILLARY BLOOD GLUCOSE      PHYSICAL EXAM  General: A&Ox3; distress 2/2 pain  Head: NC/AT; MMM; PERRL; EOMI;  Respiratory: CTA B/L; no wheezes/crackles   Cardiovascular: Regular rhythm/rate; S1/S2   Gastrointestinal: Soft; NTND; normoactive BS  Extremities: WWP; no edema/cyanosis  Neurological:  CNII-XII grossly intact; no obvious focal deficits, 5/5 str all 4 ext, sensation intact bl  MSK: Hematoma w/ ecchymosis on L. hip.  Abrasion of L elbow.  Chest tender to palpation on LL.     left acromion process w/ mild tenderness. no ecchymosis or signs of trauma. left shoulder with full ROM.      MEDICATIONS  (STANDING):  sodium chloride 0.9%. 1000milliLiter(s) IV Continuous <Continuous>  BACItracin   Ointment 1Application(s) Topical daily  pseudoephedrine 60milliGRAM(s) Oral every 12 hours  fluticasone propionate 50 MICROgram(s)/spray Nasal Spray 1Spray(s) Both Nostrils two times a day  polyethylene glycol 3350 17Gram(s) Oral daily    MEDICATIONS  (PRN):  acetaminophen   Tablet. 650milliGRAM(s) Oral every 6 hours PRN Mild Pain (1 - 3)  ibuprofen  Tablet 600milliGRAM(s) Oral every 6 hours PRN Moderate pain  traMADol 50milliGRAM(s) Oral every 6 hours PRN Severe Pain (7 - 10)  ondansetron Injectable 4milliGRAM(s) IV Push every 6 hours PRN Nausea and/or Vomiting      Hayfever (Rash)  No Known Drug Allergies      LABS                        11.6   3.6   )-----------( 188      ( 02 May 2017 06:18 )             34.8     05-02    139  |  106  |  13  ----------------------------<  109<H>  4.9   |  27  |  0.64    Ca    8.8      02 May 2017 06:29      IMAGING/EKG/ETC  CT Head: The ventricles, sulci and cisterns are normal in caliber for patient's   age.  There is no evidence of intracranial hemorrhage, mass or acute   infarction.  There is no lesion or fracture of theskull or skull base.    CT Hip: Impression:   Nondisplaced fracture of the wing of the left ilium extending to the   acetabulum.   Minimally displaced fracture of the right pubic tubercle extending into   the pubic symphysis.   Cortical irregularity of the left ischium may represent acute fracture   versus old fracture.   Left hip soft tissue hematoma measuring 11 cm in greatest dimension.    CT Pelvis: Impression:   Nondisplaced fracture of the wing of the left ilium extending to the   acetabulum.   Minimally displaced fracture of the right pubic tubercle extending into   the pubic symphysis.   Cortical irregularity of the left ischium may represent acute fracture   versus old fracture.   Left hip soft tissue hematoma measuring 11 cm in greatest dimension.
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
## 2017-05-03 NOTE — PROGRESS NOTE ADULT - PROBLEM SELECTOR PLAN 2
Improving, roughly 5cm x 3cm L hip area, slightly tender on exam. On CT: area [id number]. Likely 2/2 trauma.  Hgb stable.  - No intervention necessary

## 2017-05-03 NOTE — PROGRESS NOTE ADULT - PROBLEM SELECTOR PLAN 1
Pt w/ R pubic ramus fx and L acetabulum fx. Ortho consulted in ED, per Ortho: non-operative fx, recommend wt bearing as tolerated, pain control, PT consult. No neurologic deficits. Pt states aversion to most opiates, will try Tylenol, Ibuprofen as needed w/ Tramadol PRN for severe pain.  - F/u PT: D/C to [facility]
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
## 2017-05-08 DIAGNOSIS — S32.511A FRACTURE OF SUPERIOR RIM OF RIGHT PUBIS, INITIAL ENCOUNTER FOR CLOSED FRACTURE: ICD-10-CM

## 2017-05-08 DIAGNOSIS — E87.1 HYPO-OSMOLALITY AND HYPONATREMIA: ICD-10-CM

## 2017-05-08 DIAGNOSIS — S32.9XXA FRACTURE OF UNSPECIFIED PARTS OF LUMBOSACRAL SPINE AND PELVIS, INITIAL ENCOUNTER FOR CLOSED FRACTURE: ICD-10-CM

## 2017-05-08 DIAGNOSIS — Z90.49 ACQUIRED ABSENCE OF OTHER SPECIFIED PARTS OF DIGESTIVE TRACT: ICD-10-CM

## 2017-05-08 DIAGNOSIS — R74.0 NONSPECIFIC ELEVATION OF LEVELS OF TRANSAMINASE AND LACTIC ACID DEHYDROGENASE [LDH]: ICD-10-CM

## 2017-05-08 DIAGNOSIS — S70.02XA CONTUSION OF LEFT HIP, INITIAL ENCOUNTER: ICD-10-CM
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
## 2017-05-08 DIAGNOSIS — R11.14 BILIOUS VOMITING: ICD-10-CM

## 2017-05-08 DIAGNOSIS — S32.402A UNSPECIFIED FRACTURE OF LEFT ACETABULUM, INITIAL ENCOUNTER FOR CLOSED FRACTURE: ICD-10-CM
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
## 2017-05-08 DIAGNOSIS — G44.209 TENSION-TYPE HEADACHE, UNSPECIFIED, NOT INTRACTABLE: ICD-10-CM

## 2017-05-08 DIAGNOSIS — D70.8 OTHER NEUTROPENIA: ICD-10-CM

## 2017-05-08 DIAGNOSIS — M25.512 PAIN IN LEFT SHOULDER: ICD-10-CM

## 2017-05-08 DIAGNOSIS — S32.602A UNSPECIFIED FRACTURE OF LEFT ISCHIUM, INITIAL ENCOUNTER FOR CLOSED FRACTURE: ICD-10-CM

## 2017-05-08 DIAGNOSIS — S32.392A OTHER FRACTURE OF LEFT ILIUM, INITIAL ENCOUNTER FOR CLOSED FRACTURE: ICD-10-CM
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
## 2020-06-02 NOTE — PROGRESS NOTE ADULT - PROBLEM/PLAN-7
[de-identified] : Scoliosis x-rays AP and lateral done on 01/09/2020 show 18 degree thoracic curvature and 19 degree thoracolumbar curvature. Patient is Risser 3+. There is normal kyphosis and lordosis appreciated on lateral films. No brace holiday done.\par \par AP lateral spine radiographs done today (05/28/2020) in clinic depict a right thoracic curve of 19 degrees, left thoracolumbar curve of 20 degrees. Patient is Risser 4. DISPLAY PLAN FREE TEXT

## 2021-12-16 ENCOUNTER — EMERGENCY (EMERGENCY)
Facility: HOSPITAL | Age: 56
LOS: 1 days | Discharge: HOME | End: 2021-12-16
Attending: EMERGENCY MEDICINE | Admitting: EMERGENCY MEDICINE
Payer: COMMERCIAL

## 2021-12-16 VITALS
TEMPERATURE: 98 F | OXYGEN SATURATION: 95 % | SYSTOLIC BLOOD PRESSURE: 132 MMHG | RESPIRATION RATE: 18 BRPM | DIASTOLIC BLOOD PRESSURE: 87 MMHG | WEIGHT: 115.96 LBS | HEART RATE: 68 BPM | HEIGHT: 65 IN

## 2021-12-16 VITALS
OXYGEN SATURATION: 99 % | HEART RATE: 55 BPM | RESPIRATION RATE: 18 BRPM | TEMPERATURE: 98 F | SYSTOLIC BLOOD PRESSURE: 150 MMHG | DIASTOLIC BLOOD PRESSURE: 91 MMHG
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
## 2021-12-16 DIAGNOSIS — S59.902A UNSPECIFIED INJURY OF LEFT ELBOW, INITIAL ENCOUNTER: ICD-10-CM

## 2021-12-16 DIAGNOSIS — Z90.49 ACQUIRED ABSENCE OF OTHER SPECIFIED PARTS OF DIGESTIVE TRACT: Chronic | ICD-10-CM

## 2021-12-16 DIAGNOSIS — S52.026A: ICD-10-CM

## 2021-12-16 DIAGNOSIS — Z90.89 ACQUIRED ABSENCE OF OTHER ORGANS: Chronic | ICD-10-CM

## 2021-12-16 PROBLEM — S62.102S: Chronic | Status: ACTIVE | Noted: 2017-04-29

## 2021-12-16 PROBLEM — S83.519A SPRAIN OF ANTERIOR CRUCIATE LIGAMENT OF UNSPECIFIED KNEE, INITIAL ENCOUNTER: Chronic | Status: ACTIVE | Noted: 2017-04-29

## 2021-12-16 PROCEDURE — 99284 EMERGENCY DEPT VISIT MOD MDM: CPT | Mod: 25

## 2021-12-16 PROCEDURE — 73110 X-RAY EXAM OF WRIST: CPT | Mod: 26,LT

## 2021-12-16 PROCEDURE — 99284 EMERGENCY DEPT VISIT MOD MDM: CPT

## 2021-12-16 PROCEDURE — 24670 CLTX ULNAR FX PROX W/O MNPJ: CPT

## 2021-12-16 PROCEDURE — 73080 X-RAY EXAM OF ELBOW: CPT

## 2021-12-16 PROCEDURE — 73110 X-RAY EXAM OF WRIST: CPT

## 2021-12-16 PROCEDURE — 99283 EMERGENCY DEPT VISIT LOW MDM: CPT | Mod: 57

## 2021-12-16 PROCEDURE — 73080 X-RAY EXAM OF ELBOW: CPT | Mod: 26,LT

## 2021-12-16 RX ORDER — IBUPROFEN 200 MG
600 TABLET ORAL ONCE
Refills: 0 | Status: COMPLETED | OUTPATIENT
Start: 2021-12-16 | End: 2021-12-16

## 2021-12-16 RX ADMIN — Medication 600 MILLIGRAM(S): at 13:38

## 2021-12-16 NOTE — CONSULT NOTE ADULT - SUBJECTIVE AND OBJECTIVE BOX
Orthopaedic Surgery Consult Note    CC: Patient is a 56y old  Female who presents with a chief complaint of fall with left elbow pain    HPI:  57yo Female who tripped and fell down a few stairs this morning. Since that time she has noticed increased swelling and ecchymoses at her left elbow. She denies significant pain. She has mild pain aggravated by movement, alleviated by rest. There is no numbness or radiation of pain. She denies previous injury to her left elbow; however, she has had surgery for a left scaphoid fracture and a left distal radius fracture as well as a R rotator cuff repair. Pt denies any recent fevers/chills, chest pain, body aches, shortness of breath, sick contacts.      ROS: Positive for left elbow pain  Denies fevers, chills, headache, dizziness, chest pain, shortness of breath, nausea, vomiting.   All other systems negative, except for above.     Allergies  Hayfever (Rash)  No Known Drug Allergies    PAST MEDICAL & SURGICAL HISTORY:  ACL tear  Wrist fracture, closed, left, sequela  History of appendectomy  History of tonsillectomy    Social History: Pt denies tobacco use    FAMILY HISTORY:  Family history of CHF (congestive heart failure) (Father)    Vital Signs Last 24 Hrs  T(C): 36.6 (16 Dec 2021 12:24), Max: 36.6 (16 Dec 2021 12:24)  T(F): 97.9 (16 Dec 2021 12:24), Max: 97.9 (16 Dec 2021 12:24)  HR: 68 (16 Dec 2021 12:24) (68 - 68)  BP: 132/87 (16 Dec 2021 12:24) (132/87 - 132/87)  RR: 18 (16 Dec 2021 12:24) (18 - 18)  SpO2: 95% (16 Dec 2021 12:24) (95% - 95%)    PE:  Constitutional: vitals reviewed per nursing documentation, NAD, sitting comfortably in chair  Eyes: PERRLA, no obvious deformity, ecchymoses, swelling of eyelids  Neck: trachea midline  Lungs: not using accessory muscles of respiration, normal respiratory effort  Neuro/Psych: A&Ox3, normal affect and mood  MSK:   LUE - swelling and ecchymoses over the left elbow, + small abrasions without open wounds, no erythema or drainage, sensation intact to light touch, 4/5  with pain in the elbow, 4/5 triceps strength with pain, 5/5 biceps strength with pain in the elbow, 2+ radial pulse, + tenderness to the olecranon, mild pain with elbow ROM,no instability or laxity of joints  RUE - sensation intact to light touch, 5/5 /biceps/triceps strength, 2+ radial pulse, no open wounds/erythema/ecchymoses, no tenderness through upper extremity, FROM without pain, no instability or laxity of joints    Imaging: AP and lateral views of the left elbow demonstrate intraarticular minimally displaced olecranon fracture   AP, lateral and oblique views of the left wrist demonstrate hardware in place without evidence of acute fracture or dislocation    A/P: 57yo Female with left olecranon fracture   - Pt placed in a splint and given a sling  - NWB LUE   - Pt advised not to remove the splint and keep it clean and dry.   - Pain control as needed   - Reviewed imaging   - Pt advised her fracture is surgical and she will likely need ORIF. Discussed risks and benefits including pain, malunion, nonunion, nerve injury, arterial injury, infection. Pt understands.   - Pt to follow up with Dr. Tompkins Monday 12/20 to further discuss operative treatment.   - Given strict ED return precautions including severe pain, numbness, other concerns.   - Above plan discussed with Dr. Tompkins    Ortho Pager [phone]

## 2021-12-16 NOTE — ED ADULT TRIAGE NOTE - CHIEF COMPLAINT QUOTE
pt is s/p slip and fall on stairs, hit Lt elbow, c/o Lt elbow pain. Also states she struck occipital head. Denies LOC, n/v. no anticoagulant use.

## 2021-12-16 NOTE — ED PROVIDER NOTE - NSFOLLOWUPINSTRUCTIONS_ED_ALL_ED_FT
Please follow up with Dr. Tompkins as advised on Monday.   Return to the ER if you develop any concerning symptoms.        Elbow Fracture    WHAT YOU NEED TO KNOW:    An elbow fracture is a break in one or more of the 3 bones that form your elbow joint. Osteoporosis (brittle bones) can increase your risk for an elbow fracture.    Adult Arm Bones         DISCHARGE INSTRUCTIONS:    Return to the emergency department if:   •Your skin becomes swollen, cold, or pale.      •Your elbow, hand, or fingers are numb.      Call your doctor if:   •You have a fever.      •The pain gets worse, even after you rest and take your medicine.      •You have new or more trouble moving your arm.      •You have new sores around the area of your brace, splint, or cast.      •Your brace, splint, or cast becomes damaged.      •You have questions or concerns about your condition or care.      Medicines: You may need any of the following:   •Prescription pain medicine may be given. Ask your healthcare provider how to take this medicine safely. Some prescription pain medicines contain acetaminophen. Do not take other medicines that contain acetaminophen without talking to your healthcare provider. Too much acetaminophen may cause liver damage. Prescription pain medicine may cause constipation. Ask your healthcare provider how to prevent or treat constipation.       •NSAIDs, such as ibuprofen, help decrease swelling, pain, and fever. This medicine is available with or without a doctor's order. NSAIDs can cause stomach bleeding or kidney problems in certain people. If you take blood thinner medicine, always ask your healthcare provider if NSAIDs are safe for you. Always read the medicine label and follow directions.      •Take your medicine as directed. Contact your healthcare provider if you think your medicine is not helping or if you have side effects. Tell him or her if you are allergic to any medicine. Keep a list of the medicines, vitamins, and herbs you take. Include the amounts, and when and why you take them. Bring the list or the pill bottles to follow-up visits. Carry your medicine list with you in case of an emergency.      Self-care:   •Elevate your elbow above the level of your heart as often as you can. This will help decrease swelling and pain. Prop your elbow on pillows or blankets to keep it elevated comfortably. While your elbow is elevated, wiggle your fingers and open and close them to prevent hand stiffness.             •Apply ice on your elbow on your elbow for 15 to 20 minutes every hour or as directed. Use an ice pack, or put crushed ice in a plastic bag. Cover it with a towel. Ice helps prevent tissue damage and decreases swelling and pain.      •Go to physical therapy as directed. A physical therapist can teach you exercises to help improve movement and strength and to decrease pain.      Care for your brace, cast, or splint: Follow instructions about when you may take a bath or shower. It is important not to get your brace, cast, or splint wet. Cover your device with a plastic bag before you bathe. Tape the bag to your skin above the device to help keep out water. Hold your elbow away from the water in case the bag breaks.  •Check the skin around your cast, brace, or splint daily for any redness or open skin.      •Do not use a sharp or pointed object to scratch your skin under the cast, brace, or splint.      •Do not remove your brace or splint unless directed.      Follow up with your doctor as directed: You may need to see a specialist. You may need more x-rays and treatment. Write down your questions so you remember to ask them during your visits.       © Copyright [email or web address] 2021

## 2021-12-16 NOTE — ED ADULT NURSE NOTE - OBJECTIVE STATEMENT
Pt is a 55 y/o female A&Ox4 in NAD ambulatory with steady gait c/o left elbow pain s/p mechanical fall on stairs. Pt endorses + posterior head injury. Pt denies LOC, blood thinners, N/V. Pt talking in clear, full sentences, respirations even and unlabored.

## 2021-12-16 NOTE — ED PROVIDER NOTE - NSICDXPASTSURGICALHX_GEN_ALL_CORE_FT
PAST SURGICAL HISTORY:  History of appendectomy     History of tonsillectomy       H/O left wrist surgery

## 2021-12-16 NOTE — ED PROVIDER NOTE - MUSCULOSKELETAL, MLM
Tenderness to palpation over left elbow. Mild minimal abrasion, no bleeding, noted over the posterior aspect of elbow. Bruising noted over antecubital region (pt states she had recent blood draw there). No C/T/L spine tenderness. Tenderness to palpation over left elbow. small abrasion, no bleeding, noted over the posterior aspect of elbow. Bruising noted over antecubital region (pt states she had recent blood draw there). No C/T/L spine tenderness.

## 2021-12-16 NOTE — ED PROVIDER NOTE - CLINICAL SUMMARY MEDICAL DECISION MAKING FREE TEXT BOX
55 y/o F w/ surgical Hx of left wrist surgery (pins x3), presents for left elbow pain s/p trip and fall 6 hrs prior to ED arrival. Pt tripped and fell backwards onto left elbow and hit occipital head. Denies LOC, neck or back pain, headache, CP, or SOB. Plan to give ibuprofen for pain and obtain left elbow and left wrist xrays. 57 y/o F w/ surgical Hx of left wrist surgery (pins x3), presents for left elbow pain s/p trip and fall 6 hrs prior to ED arrival. Pt tripped and fell backwards onto left elbow and hit occipital head. Denies LOC, neck or back pain, headache, CP, or SOB. Plan to give ibuprofen for pain and obtain left elbow and left wrist xrays.  ED course: VS noted. XRs noted and left olecranon intra-articular fx noted. Ortho consulted and in ED to see pt and pt placed in splint and sling by Ortho. To f/up outpt with Ortho. Pain meds prn. Non-toxic appearing and stable for discharge. To follow up outpatient. Strict return precautions given.

## 2021-12-16 NOTE — ED PROVIDER NOTE - OBJECTIVE STATEMENT
55 y/o F w/ surgical Hx of left wrist surgery (pins x3), and no chronic meds use, right hand dominant, presents for left elbow pain s/p trip and fall at 7:30am (6 hrs prior to ED arrival). States that she suddenly tripped and fell while walking, fell backwards, and landed on her left elbow and hit occipital head. Denies LOC, neck or back pain, headache, CP, or SOB. Reports that she has not taken any meds for pain. Fully vaccinated for COVID19. 57 y/o F w/ surgical Hx of left wrist surgery (pins x3), and no chronic meds use, right hand dominant, presents for left elbow pain s/p accidental trip and fall at 7:30am (6 hrs prior to ED arrival). States that she suddenly tripped and fell while walking, fell backwards, and landed on her left elbow and hit occipital head. Denies LOC, neck or back pain, headache, CP, or SOB. Reports that she has not taken any meds for pain. Fully vaccinated for COVID19.

## 2021-12-16 NOTE — ED PROVIDER NOTE - PATIENT PORTAL LINK FT
You can access the FollowMyHealth Patient Portal offered by [facility] by registering at the following website: [email or web address]. By joining [employer]’s FollowMyHealth portal, you will also be able to view your health information using other applications (apps) compatible with our system.

## 2021-12-16 NOTE — ED ADULT NURSE NOTE - NSIMPLEMENTINTERV_GEN_ALL_ED
Implemented All Fall Risk Interventions:  [place] to call system. Call bell, personal items and telephone within reach. Instruct patient to call for assistance. Room bathroom lighting operational. Non-slip footwear when patient is off stretcher. Physically safe environment: no spills, clutter or unnecessary equipment. Stretcher in lowest position, wheels locked, appropriate side rails in place. Provide visual cue, wrist band, yellow gown, etc. Monitor gait and stability. Monitor for mental status changes and reorient to person, place, and time. Review medications for side effects contributing to fall risk. Reinforce activity limits and safety measures with patient and family.

## 2021-12-16 NOTE — ED PROVIDER NOTE - CARE PROVIDER_API CALL
Aamir Tompkins (MD)  [facility]  Orthopedics  [address], [address] [place] [address]  Phone: [phone]  Fax: [phone]  Follow Up Time:

## 2021-12-16 NOTE — ED PROVIDER NOTE - TEMPLATE, MLM
PCP: Leann Campos MD    Last appt: 10/15/2021  Future Appointments   Date Time Provider Keke Jerez   1/18/2022  2:40 PM Leann Campos MD [facility] BS AMB       Requested Prescriptions     Pending Prescriptions Disp Refills    traMADoL (ULTRAM) 50 mg tablet 30 Tablet 0     Sig: Take 1 Tablet by mouth daily for 30 days. Max Daily Amount: 50 mg. Fall

## 2021-12-17 ENCOUNTER — APPOINTMENT (OUTPATIENT)
Dept: ORTHOPEDIC SURGERY | Facility: CLINIC | Age: 56
End: 2021-12-17
Payer: COMMERCIAL

## 2021-12-17 VITALS — WEIGHT: 116 LBS | HEIGHT: 65 IN | BODY MASS INDEX: 19.33 KG/M2 | RESPIRATION RATE: 16 BRPM

## 2021-12-17 DIAGNOSIS — M81.0 AGE-RELATED OSTEOPOROSIS W/OUT CURRENT PATHOLOGICAL FRACTURE: ICD-10-CM

## 2021-12-17 DIAGNOSIS — Z78.9 OTHER SPECIFIED HEALTH STATUS: ICD-10-CM

## 2021-12-17 PROBLEM — Z00.00 ENCOUNTER FOR PREVENTIVE HEALTH EXAMINATION: Status: ACTIVE | Noted: 2021-12-17

## 2021-12-17 PROCEDURE — 73070 X-RAY EXAM OF ELBOW: CPT | Mod: LT

## 2021-12-17 PROCEDURE — 99203 OFFICE O/P NEW LOW 30 MIN: CPT

## 2021-12-18 ENCOUNTER — LABORATORY RESULT (OUTPATIENT)
Age: 56
End: 2021-12-18

## 2021-12-18 DIAGNOSIS — Z98.890 OTHER SPECIFIED POSTPROCEDURAL STATES: Chronic | ICD-10-CM

## 2021-12-20 ENCOUNTER — TRANSCRIPTION ENCOUNTER (OUTPATIENT)
Age: 56
End: 2021-12-20

## 2021-12-20 RX ORDER — OXYCODONE AND ACETAMINOPHEN 5; 325 MG/1; MG/1
5-325 TABLET ORAL
Qty: 20 | Refills: 0 | Status: ACTIVE | COMMUNITY
Start: 2021-12-20 | End: 1900-01-01

## 2021-12-21 ENCOUNTER — APPOINTMENT (OUTPATIENT)
Dept: ORTHOPEDIC SURGERY | Facility: AMBULATORY SURGERY CENTER | Age: 56
End: 2021-12-21
Payer: COMMERCIAL

## 2021-12-21 ENCOUNTER — OUTPATIENT (OUTPATIENT)
Dept: OUTPATIENT SERVICES | Facility: HOSPITAL | Age: 56
LOS: 1 days | Discharge: HOME | End: 2021-12-21

## 2021-12-21 DIAGNOSIS — Z98.890 OTHER SPECIFIED POSTPROCEDURAL STATES: Chronic | ICD-10-CM

## 2021-12-21 DIAGNOSIS — Z90.89 ACQUIRED ABSENCE OF OTHER ORGANS: Chronic | ICD-10-CM

## 2021-12-21 DIAGNOSIS — Z90.49 ACQUIRED ABSENCE OF OTHER SPECIFIED PARTS OF DIGESTIVE TRACT: Chronic | ICD-10-CM

## 2021-12-21 PROCEDURE — 24685 OPTX ULNAR FX PROX END W/FIX: CPT | Mod: LT

## 2021-12-21 PROCEDURE — 64718 REVISE ULNAR NERVE AT ELBOW: CPT | Mod: 59,LT

## 2022-01-03 ENCOUNTER — APPOINTMENT (OUTPATIENT)
Dept: ORTHOPEDIC SURGERY | Facility: CLINIC | Age: 57
End: 2022-01-03
Payer: COMMERCIAL

## 2022-01-03 PROCEDURE — 73070 X-RAY EXAM OF ELBOW: CPT | Mod: LT

## 2022-01-13 ENCOUNTER — LABORATORY RESULT (OUTPATIENT)
Age: 57
End: 2022-01-13

## 2022-01-13 ENCOUNTER — APPOINTMENT (OUTPATIENT)
Dept: ORTHOPEDIC SURGERY | Facility: CLINIC | Age: 57
End: 2022-01-13
Payer: COMMERCIAL

## 2022-01-13 PROCEDURE — 20605 DRAIN/INJ JOINT/BURSA W/O US: CPT | Mod: 58,LT

## 2022-01-13 PROCEDURE — 73070 X-RAY EXAM OF ELBOW: CPT | Mod: LT

## 2022-01-13 PROCEDURE — 99024 POSTOP FOLLOW-UP VISIT: CPT

## 2022-01-13 RX ORDER — SULFAMETHOXAZOLE AND TRIMETHOPRIM 800; 160 MG/1; MG/1
800-160 TABLET ORAL TWICE DAILY
Qty: 20 | Refills: 0 | Status: ACTIVE | COMMUNITY
Start: 2022-01-13 | End: 1900-01-01

## 2022-01-14 ENCOUNTER — APPOINTMENT (OUTPATIENT)
Dept: ORTHOPEDIC SURGERY | Facility: CLINIC | Age: 57
End: 2022-01-14
Payer: COMMERCIAL

## 2022-01-14 ENCOUNTER — NON-APPOINTMENT (OUTPATIENT)
Age: 57
End: 2022-01-14

## 2022-01-14 DIAGNOSIS — G56.22 LESION OF ULNAR NERVE, LEFT UPPER LIMB: ICD-10-CM

## 2022-01-14 PROCEDURE — 99024 POSTOP FOLLOW-UP VISIT: CPT

## 2022-01-15 ENCOUNTER — NON-APPOINTMENT (OUTPATIENT)
Age: 57
End: 2022-01-15

## 2022-01-16 RX ORDER — CEPHALEXIN 500 MG/1
500 CAPSULE ORAL 4 TIMES DAILY
Qty: 40 | Refills: 0 | Status: ACTIVE | COMMUNITY
Start: 2022-01-16 | End: 1900-01-01

## 2022-01-16 RX ORDER — CEPHALEXIN 500 MG/1
500 TABLET ORAL 4 TIMES DAILY
Qty: 28 | Refills: 0 | Status: ACTIVE | COMMUNITY
Start: 2022-01-12 | End: 1900-01-01

## 2022-01-18 ENCOUNTER — APPOINTMENT (OUTPATIENT)
Dept: ORTHOPEDIC SURGERY | Facility: AMBULATORY SURGERY CENTER | Age: 57
End: 2022-01-18
Payer: COMMERCIAL

## 2022-01-18 PROCEDURE — ZZZZZ: CPT

## 2022-01-19 ENCOUNTER — APPOINTMENT (OUTPATIENT)
Dept: ORTHOPEDIC SURGERY | Facility: CLINIC | Age: 57
End: 2022-01-19
Payer: COMMERCIAL

## 2022-01-19 PROCEDURE — 99024 POSTOP FOLLOW-UP VISIT: CPT

## 2022-01-19 PROCEDURE — 20605 DRAIN/INJ JOINT/BURSA W/O US: CPT | Mod: 58,LT

## 2022-01-19 PROCEDURE — 73070 X-RAY EXAM OF ELBOW: CPT | Mod: LT

## 2022-01-19 NOTE — HISTORY OF PRESENT ILLNESS
[de-identified] : DOS-12/21/21 [de-identified] : 4 weeks  Open reduction and internal fixation of left olecranon fracture using [employer] olecranon plate and Left ulnar nerve in situ decompression at the elbow. I have been in constant communication with the patient since last Thursday. Symptoms have been improving. Cultures were positive for Enterobacter cloaca complex. Not sensitive to cefazolin but sensitive to Cipro and Bactrim. With the help of ID (Hayley Jones) she recommended discontinuing the Keflex and only using Cipro. Patient doing well. I discussed risk of tendon ruptures. No fevers chills or night sweats. Patient began resuming therapy yesterday. Patient here for follow-up [de-identified] : Incision healing nicely.  No longer erythema.  No warmth.  Questionable residual sponginess or fluid.  Range of motion from 5degrees to 110 degrees [de-identified] : PA and lateral x-rays were taken of the elbow demonstrate hardware in good position.  Fracture lines disappearing [de-identified] : 4 weeks Open reduction and internal fixation of left olecranon fracture using [employer] olecranon plate and Left ulnar nerve in situ decompression. [de-identified] : Patient doing well.  We discussed options including continued oral antibiotics and attempted a second aspiration.  She elected to undergo an attempt at an aspiration.  The small area was aspirated but no significant fluid obtained on aspiration and only a few clear drops of fluid expressed after.  This was after the area was prepped and draped.  Sterile bandage applied consisting of 4 x 4's and Coban.  She will leave this wrapped up for at least 24 hours if not more.  She can continue with gentle range of motion and therapy on Friday.  I will see her back Monday.  If she thinks there are any worsening symptoms she will let me know.  She should now work on additional flexion.  Because of the small area she is at increased risk of needing the hardware removed but certainly would like to give it more time until doing that.  She will contact me if there are any issues

## 2022-01-21 ENCOUNTER — APPOINTMENT (OUTPATIENT)
Dept: ORTHOPEDIC SURGERY | Facility: CLINIC | Age: 57
End: 2022-01-21

## 2022-01-24 ENCOUNTER — APPOINTMENT (OUTPATIENT)
Dept: ORTHOPEDIC SURGERY | Facility: CLINIC | Age: 57
End: 2022-01-24
Payer: COMMERCIAL

## 2022-01-24 PROCEDURE — 99024 POSTOP FOLLOW-UP VISIT: CPT

## 2022-01-24 PROCEDURE — 73070 X-RAY EXAM OF ELBOW: CPT | Mod: LT

## 2022-02-02 ENCOUNTER — APPOINTMENT (OUTPATIENT)
Dept: ORTHOPEDIC SURGERY | Facility: CLINIC | Age: 57
End: 2022-02-02
Payer: COMMERCIAL

## 2022-02-02 LAB
BASOPHILS # BLD AUTO: 0.06 K/UL
BASOPHILS NFR BLD AUTO: 1.6 %
CRP SERPL-MCNC: <3 MG/L
EOSINOPHIL # BLD AUTO: 0.03 K/UL
EOSINOPHIL NFR BLD AUTO: 0.8 %
ERYTHROCYTE [SEDIMENTATION RATE] IN BLOOD BY WESTERGREN METHOD: 16 MM/HR
HCT VFR BLD CALC: 36.8 %
HGB BLD-MCNC: 12.4 G/DL
IMM GRANULOCYTES NFR BLD AUTO: 0.3 %
LYMPHOCYTES # BLD AUTO: 1.24 K/UL
LYMPHOCYTES NFR BLD AUTO: 32.8 %
MAN DIFF?: NORMAL
MCHC RBC-ENTMCNC: 31.6 PG
MCHC RBC-ENTMCNC: 33.7 GM/DL
MCV RBC AUTO: 93.6 FL
MONOCYTES # BLD AUTO: 0.35 K/UL
MONOCYTES NFR BLD AUTO: 9.3 %
NEUTROPHILS # BLD AUTO: 2.09 K/UL
NEUTROPHILS NFR BLD AUTO: 55.2 %
PLATELET # BLD AUTO: 267 K/UL
RBC # BLD: 3.93 M/UL
RBC # FLD: 12.2 %
WBC # FLD AUTO: 3.78 K/UL

## 2022-02-02 PROCEDURE — 73070 X-RAY EXAM OF ELBOW: CPT | Mod: LT

## 2022-02-02 PROCEDURE — 99024 POSTOP FOLLOW-UP VISIT: CPT

## 2022-02-08 ENCOUNTER — TRANSCRIPTION ENCOUNTER (OUTPATIENT)
Age: 57
End: 2022-02-08

## 2022-02-08 RX ORDER — HYDROCODONE BITARTRATE AND ACETAMINOPHEN 5; 325 MG/1; MG/1
5-325 TABLET ORAL
Qty: 20 | Refills: 0 | Status: ACTIVE | COMMUNITY
Start: 2022-02-08 | End: 1900-01-01

## 2022-02-09 ENCOUNTER — APPOINTMENT (OUTPATIENT)
Dept: ORTHOPEDIC SURGERY | Facility: AMBULATORY SURGERY CENTER | Age: 57
End: 2022-02-09
Payer: COMMERCIAL

## 2022-02-09 ENCOUNTER — RESULT REVIEW (OUTPATIENT)
Age: 57
End: 2022-02-09

## 2022-02-09 ENCOUNTER — OUTPATIENT (OUTPATIENT)
Dept: OUTPATIENT SERVICES | Facility: HOSPITAL | Age: 57
LOS: 1 days | Discharge: HOME | End: 2022-02-09
Payer: COMMERCIAL

## 2022-02-09 DIAGNOSIS — Z90.49 ACQUIRED ABSENCE OF OTHER SPECIFIED PARTS OF DIGESTIVE TRACT: Chronic | ICD-10-CM

## 2022-02-09 DIAGNOSIS — Z90.89 ACQUIRED ABSENCE OF OTHER ORGANS: Chronic | ICD-10-CM

## 2022-02-09 DIAGNOSIS — Z98.890 OTHER SPECIFIED POSTPROCEDURAL STATES: Chronic | ICD-10-CM

## 2022-02-09 LAB
GRAM STN FLD: SIGNIFICANT CHANGE UP
SPECIMEN SOURCE: SIGNIFICANT CHANGE UP

## 2022-02-09 PROCEDURE — 20680 REMOVAL OF IMPLANT DEEP: CPT | Mod: 58,LT

## 2022-02-09 PROCEDURE — 88311 DECALCIFY TISSUE: CPT | Mod: 26

## 2022-02-09 PROCEDURE — 88305 TISSUE EXAM BY PATHOLOGIST: CPT | Mod: 26

## 2022-02-09 PROCEDURE — 88300 SURGICAL PATH GROSS: CPT | Mod: 26,59

## 2022-02-09 RX ORDER — CIPROFLOXACIN HYDROCHLORIDE 500 MG/1
500 TABLET, FILM COATED ORAL
Qty: 14 | Refills: 0 | Status: ACTIVE | COMMUNITY
Start: 2022-01-16 | End: 1900-01-01

## 2022-02-11 LAB — SURGICAL PATHOLOGY STUDY: SIGNIFICANT CHANGE UP

## 2022-02-14 ENCOUNTER — APPOINTMENT (OUTPATIENT)
Dept: ORTHOPEDIC SURGERY | Facility: CLINIC | Age: 57
End: 2022-02-14
Payer: COMMERCIAL

## 2022-02-14 PROCEDURE — 73070 X-RAY EXAM OF ELBOW: CPT | Mod: 50

## 2022-02-14 PROCEDURE — 99024 POSTOP FOLLOW-UP VISIT: CPT

## 2022-02-18 ENCOUNTER — APPOINTMENT (OUTPATIENT)
Dept: ORTHOPEDIC SURGERY | Facility: CLINIC | Age: 57
End: 2022-02-18
Payer: COMMERCIAL

## 2022-02-18 DIAGNOSIS — L03.90 CELLULITIS, UNSPECIFIED: ICD-10-CM

## 2022-02-18 DIAGNOSIS — L02.91 CELLULITIS, UNSPECIFIED: ICD-10-CM

## 2022-02-18 PROCEDURE — 99024 POSTOP FOLLOW-UP VISIT: CPT

## 2022-02-18 NOTE — HISTORY OF PRESENT ILLNESS
[Chills] : no chills [Fever] : no fever [Healed] : healed [Doing Well] : is doing well [No Sign of Infection] : is showing no signs of infection [Adequate Pain Control] : has adequate pain control [de-identified] : DOS: 2/9/2022 [de-identified] : 9 days s/p Left elbow hardware removal (deep), left elbow hardware removal plate and screws, Left olecranon Bone Biopsy.  Biopsy negative.  No fever chills or night sweats here for wound check. [de-identified] : Incision healing beautifully.  Range of motion 10 degrees to 140 degrees.  No warmth or erythema.  Incision healing beautifully [de-identified] : 9 days s/p Left elbow hardware removal (deep), left elbow hardware removal plate and screws, Left olecranon Bone Biopsy [de-identified] : Patient rewrapped.  She will continue with the posterior splint until Monday and then can take bandage off and get wet.  She will leave the Steri-Strips on.  She can begin range of motion exercises at that point.  And I will see her back on March 7

## 2022-03-01 LAB
CULTURE RESULTS: NO GROWTH — SIGNIFICANT CHANGE UP
SPECIMEN SOURCE: SIGNIFICANT CHANGE UP

## 2022-03-07 ENCOUNTER — APPOINTMENT (OUTPATIENT)
Dept: ORTHOPEDIC SURGERY | Facility: CLINIC | Age: 57
End: 2022-03-07
Payer: COMMERCIAL

## 2022-03-07 PROCEDURE — 99024 POSTOP FOLLOW-UP VISIT: CPT

## 2022-03-07 PROCEDURE — 73070 X-RAY EXAM OF ELBOW: CPT | Mod: LT

## 2022-03-12 LAB
CULTURE RESULTS: SIGNIFICANT CHANGE UP
SPECIMEN SOURCE: SIGNIFICANT CHANGE UP

## 2022-04-04 ENCOUNTER — APPOINTMENT (OUTPATIENT)
Dept: ORTHOPEDIC SURGERY | Facility: CLINIC | Age: 57
End: 2022-04-04
Payer: COMMERCIAL

## 2022-04-04 DIAGNOSIS — S52.032D: ICD-10-CM

## 2022-04-04 PROCEDURE — 73070 X-RAY EXAM OF ELBOW: CPT | Mod: LT

## 2022-04-04 PROCEDURE — 99024 POSTOP FOLLOW-UP VISIT: CPT

## 2022-10-18 ENCOUNTER — APPOINTMENT (OUTPATIENT)
Dept: ORTHOPEDIC SURGERY | Facility: CLINIC | Age: 57
End: 2022-10-18

## 2023-07-09 NOTE — PHYSICAL THERAPY INITIAL EVALUATION ADULT - RANGE OF MOTION EXAMINATION, REHAB EVAL
bilateral upper extremity ROM was WFL (within functional limits)/bilateral lower extremity ROM was WFL (within functional limits) [employer]
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
## (undated) DEVICE — SLV COMPRESSION KNEE MED

## (undated) DEVICE — SUT ETHILON 4-0 18" FS-2

## (undated) DEVICE — DRAPE TOWEL BLUE 17" X 24"

## (undated) DEVICE — GLV 8 PROTEXIS (WHITE)

## (undated) DEVICE — PACK HAND

## (undated) DEVICE — DRAPE C ARM MINI PACK FOR 6800

## (undated) DEVICE — SUT ETHILON 5-0 18" P-3

## (undated) DEVICE — MARKING PEN W RULER

## (undated) DEVICE — DRSG KERLIX ROLL 4.5"

## (undated) DEVICE — SUT MONOCRYL 4-0 18" PS-2

## (undated) DEVICE — TOURNIQUET CUFF 18" DUAL PORT SINGLE BLADDER W PLC  (BLACK)

## (undated) DEVICE — GLV 8.5 PROTEXIS (WHITE)

## (undated) DEVICE — SUT VICRYL 4-0 18" P-3 UNDYED

## (undated) DEVICE — DRAPE IOBAN 23" X 23"